# Patient Record
Sex: FEMALE
[De-identification: names, ages, dates, MRNs, and addresses within clinical notes are randomized per-mention and may not be internally consistent; named-entity substitution may affect disease eponyms.]

---

## 2018-03-12 ENCOUNTER — HOSPITAL ENCOUNTER (EMERGENCY)
Dept: HOSPITAL 93 - ER | Age: 63
Discharge: HOME | End: 2018-03-12
Payer: COMMERCIAL

## 2018-03-12 VITALS — HEIGHT: 61 IN | WEIGHT: 180 LBS | BODY MASS INDEX: 33.99 KG/M2

## 2018-03-12 DIAGNOSIS — B34.9: Primary | ICD-10-CM

## 2018-03-12 DIAGNOSIS — J11.1: ICD-10-CM

## 2018-03-12 DIAGNOSIS — K52.9: ICD-10-CM

## 2018-12-22 ENCOUNTER — HOSPITAL ENCOUNTER (EMERGENCY)
Dept: HOSPITAL 93 - ER | Age: 63
Discharge: HOME | End: 2018-12-22
Payer: COMMERCIAL

## 2018-12-22 VITALS — BODY MASS INDEX: 33.99 KG/M2 | HEIGHT: 61 IN | WEIGHT: 180 LBS

## 2018-12-22 DIAGNOSIS — R05: ICD-10-CM

## 2018-12-22 DIAGNOSIS — R11.0: ICD-10-CM

## 2018-12-22 DIAGNOSIS — K29.60: Primary | ICD-10-CM

## 2018-12-22 DIAGNOSIS — R19.7: ICD-10-CM

## 2021-07-14 ENCOUNTER — HOSPITAL ENCOUNTER (EMERGENCY)
Dept: HOSPITAL 93 - ER | Age: 66
LOS: 1 days | Discharge: HOME | End: 2021-07-15
Payer: COMMERCIAL

## 2021-07-14 VITALS — WEIGHT: 185 LBS | BODY MASS INDEX: 34.93 KG/M2 | HEIGHT: 61 IN

## 2021-07-14 DIAGNOSIS — R10.13: ICD-10-CM

## 2021-07-14 DIAGNOSIS — K80.20: Primary | ICD-10-CM

## 2021-07-26 ENCOUNTER — HOSPITAL ENCOUNTER (INPATIENT)
Dept: HOSPITAL 93 - ER | Age: 66
LOS: 2 days | Discharge: HOME | DRG: 419 | End: 2021-07-28
Attending: SPECIALIST | Admitting: SPECIALIST
Payer: COMMERCIAL

## 2021-07-26 VITALS — HEIGHT: 61 IN | BODY MASS INDEX: 33.99 KG/M2 | WEIGHT: 180 LBS

## 2021-07-26 DIAGNOSIS — K80.00: Primary | ICD-10-CM

## 2021-07-26 DIAGNOSIS — E03.8: ICD-10-CM

## 2021-07-26 DIAGNOSIS — I10: ICD-10-CM

## 2021-07-26 DIAGNOSIS — E11.9: ICD-10-CM

## 2021-07-26 DIAGNOSIS — Z20.822: ICD-10-CM

## 2021-07-26 PROCEDURE — BF53200 OTHER IMAGING OF GALLBLADDER AND BILE DUCTS USING FLUORESCING AGENT, INDOCYANINE GREEN DYE, INTRAOPERATIVE: ICD-10-PCS | Performed by: SPECIALIST

## 2021-07-26 PROCEDURE — 0FT44ZZ RESECTION OF GALLBLADDER, PERCUTANEOUS ENDOSCOPIC APPROACH: ICD-10-PCS | Performed by: SPECIALIST

## 2021-07-26 NOTE — NUR
SE RECIBE PTE ALERTA Y ORIENTADA EN JUAN F REBECA ESFERAS. LA CUAL REFIERE VENIR POR
DOLOR ABDOMINAL Y QUE VA HACER OPERADA POR. DR. MCCRAY

## 2021-09-27 ENCOUNTER — HOSPITAL ENCOUNTER (EMERGENCY)
Dept: HOSPITAL 93 - ER | Age: 66
Discharge: HOME | End: 2021-09-27
Payer: COMMERCIAL

## 2021-09-27 VITALS — HEIGHT: 61 IN | WEIGHT: 177 LBS | BODY MASS INDEX: 33.42 KG/M2

## 2021-09-27 DIAGNOSIS — R42: Primary | ICD-10-CM

## 2021-09-29 ENCOUNTER — HOSPITAL ENCOUNTER (OUTPATIENT)
Dept: HOSPITAL 93 - NUCLEAR | Age: 66
Discharge: HOME | End: 2021-09-29
Attending: INTERNAL MEDICINE
Payer: COMMERCIAL

## 2021-09-29 DIAGNOSIS — R55: Primary | ICD-10-CM

## 2022-08-05 ENCOUNTER — HOSPITAL ENCOUNTER (EMERGENCY)
Dept: HOSPITAL 93 - ER | Age: 67
Discharge: HOME | End: 2022-08-05
Payer: COMMERCIAL

## 2022-08-05 VITALS — HEIGHT: 61 IN | BODY MASS INDEX: 33.99 KG/M2 | WEIGHT: 180 LBS

## 2022-08-05 DIAGNOSIS — R68.89: ICD-10-CM

## 2022-08-05 DIAGNOSIS — E11.9: ICD-10-CM

## 2022-08-05 DIAGNOSIS — I10: ICD-10-CM

## 2022-08-05 DIAGNOSIS — H92.03: Primary | ICD-10-CM

## 2022-08-05 DIAGNOSIS — Z79.4: ICD-10-CM

## 2024-02-26 ENCOUNTER — HOSPITAL ENCOUNTER (OUTPATIENT)
Dept: HOSPITAL 93 - SONOGRAMA | Age: 69
Discharge: HOME | End: 2024-02-26
Attending: PATHOLOGY
Payer: COMMERCIAL

## 2024-02-26 DIAGNOSIS — E07.89: ICD-10-CM

## 2024-02-26 DIAGNOSIS — E04.2: ICD-10-CM

## 2024-02-26 DIAGNOSIS — D34: Primary | ICD-10-CM

## 2025-02-11 ENCOUNTER — HOSPITAL ENCOUNTER (EMERGENCY)
Dept: HOSPITAL 93 - ER | Age: 70
Discharge: HOME | End: 2025-02-11
Payer: COMMERCIAL

## 2025-02-11 VITALS — HEIGHT: 62 IN | WEIGHT: 180 LBS | BODY MASS INDEX: 33.13 KG/M2

## 2025-02-11 DIAGNOSIS — Z79.84: ICD-10-CM

## 2025-02-11 DIAGNOSIS — Z20.822: ICD-10-CM

## 2025-02-11 DIAGNOSIS — R53.83: Primary | ICD-10-CM

## 2025-02-11 DIAGNOSIS — I10: ICD-10-CM

## 2025-02-11 DIAGNOSIS — F32.A: ICD-10-CM

## 2025-02-11 DIAGNOSIS — E11.9: ICD-10-CM

## 2025-02-11 DIAGNOSIS — I70.8: ICD-10-CM

## 2025-02-11 DIAGNOSIS — I25.10: ICD-10-CM

## 2025-02-11 DIAGNOSIS — E03.9: ICD-10-CM

## 2025-02-11 LAB
ANION GAP SERPL CALC-SCNC: 6 MMOL/L (ref 10–20)
BASOPHILS NFR BLD AUTO: 0.6 % (ref 0–1.5)
BUN SERPL-MCNC: 17 MG/DL (ref 7–18)
BUN/CREAT SERPL: 22 (ref 7–25)
CALCIUM SERPL-MCNC: 9.5 MG/DL (ref 8.5–10.1)
CHLORIDE SERPL-SCNC: 105 MMOL/L (ref 98–107)
CO2 SERPL-SCNC: 34 MEQ/L (ref 21–32)
CREAT SERPL-MCNC: 0.78 MG/DL (ref 0.55–1.02)
EGFRCR SERPLBLD CKD-EPI 2021: 72.8 ML/MIN/{1.73_M2}
EOSINOPHIL NFR BLD AUTO: 1.9 % (ref 0–7)
ERYTHROCYTE [DISTWIDTH] IN BLOOD BY AUTOMATED COUNT: 14.4 % (ref 11.5–14.5)
GLUCOSE SERPL-MCNC: 214 MG/DL (ref 65–100)
HCT VFR BLD AUTO: 43.9 % (ref 36–45)
HGB BLD-MCNC: 14.5 G/DL (ref 12–15)
LYMPHOCYTES NFR BLD AUTO: 29.3 % (ref 12–44)
MCH RBC QN AUTO: 28.4 PG (ref 27–32)
MCHC RBC AUTO-ENTMCNC: 33 G/DL (ref 32–36)
MCV RBC AUTO: 86.1 FL (ref 80–100)
MONOCYTES NFR BLD AUTO: 5.9 % (ref 2–10)
NEUTROPHILS NFR BLD AUTO: 62.3 % (ref 47–76)
OSMOLALITY SERPL: 289 MOSM/KG (ref 275–295)
PLATELET # BLD AUTO: 213 K/UL (ref 150–450)
PMV BLD AUTO: 7.7 FL (ref 7.2–11.1)
POTASSIUM SERPL-SCNC: 3.91 MEQ/L (ref 3.5–5.1)
RBC # BLD AUTO: 5.1 M/UL (ref 4–6)
SODIUM SERPL-SCNC: 141 MMOL/L (ref 136–145)
WBC # BLD AUTO: 7.1 K/UL (ref 4.5–11)

## 2025-04-04 ENCOUNTER — HOSPITAL ENCOUNTER (OUTPATIENT)
Dept: HOSPITAL 93 - NUCLEAR | Age: 70
Discharge: HOME | End: 2025-04-04
Attending: STUDENT IN AN ORGANIZED HEALTH CARE EDUCATION/TRAINING PROGRAM
Payer: COMMERCIAL

## 2025-04-04 DIAGNOSIS — I20.9: Primary | ICD-10-CM

## 2025-04-04 PROCEDURE — 93017 CV STRESS TEST TRACING ONLY: CPT

## 2025-04-04 PROCEDURE — 78452 HT MUSCLE IMAGE SPECT MULT: CPT
